# Patient Record
Sex: MALE | Race: WHITE | NOT HISPANIC OR LATINO | URBAN - NONMETROPOLITAN AREA
[De-identification: names, ages, dates, MRNs, and addresses within clinical notes are randomized per-mention and may not be internally consistent; named-entity substitution may affect disease eponyms.]

---

## 2018-09-05 PROBLEM — H33.001: Noted: 2018-09-28

## 2018-09-05 PROBLEM — H52.4: Noted: 2019-06-28

## 2018-09-05 PROBLEM — E11.9: Noted: 2018-09-28

## 2018-09-05 PROBLEM — Z96.1: Noted: 2018-09-05

## 2019-06-28 ENCOUNTER — IMPORTED ENCOUNTER (OUTPATIENT)
Dept: URBAN - NONMETROPOLITAN AREA CLINIC 1 | Facility: CLINIC | Age: 74
End: 2019-06-28

## 2019-06-28 PROCEDURE — 92014 COMPRE OPH EXAM EST PT 1/>: CPT

## 2019-06-28 PROCEDURE — 92015 DETERMINE REFRACTIVE STATE: CPT

## 2019-06-28 NOTE — PATIENT DISCUSSION
Presbyopia OUDiscussed refractive status in detail with patient New glasses Rx given todayContinue to monitorPseudophakia OU Discussed diagnosis in detail with patientBoth intraocular implants in place with open capsules OU Continue to monitor NIDDM Sans Retinopathy OUDiscussed diagnosis in detail with patientDiscussed the risk of diabetic damage of the retina with potential vision loss and the importance of routine follow-up. Stressed importance of blood sugar controlWill continue to monitorRD SCAR OD oldDiscussed diagnosis in detail with patientDiscussed in detail the S/S of retinal detachmentWimasha continue to monitor; 's Notes: MR 6/28/19DFE 6/28/19OCT 6/28/19OPTOS 6/28/19

## 2020-09-04 ENCOUNTER — IMPORTED ENCOUNTER (OUTPATIENT)
Dept: URBAN - NONMETROPOLITAN AREA CLINIC 1 | Facility: CLINIC | Age: 75
End: 2020-09-04

## 2020-09-04 PROCEDURE — 92014 COMPRE OPH EXAM EST PT 1/>: CPT

## 2020-09-04 PROCEDURE — 92015 DETERMINE REFRACTIVE STATE: CPT

## 2020-09-04 NOTE — PATIENT DISCUSSION
*EHR System was down @ time of exam all data entered in later date 9/8/20 by zane COCHRAN* Presbyopia OUDiscussed refractive status in detail with patient New glasses Rx given todayContinue to monitorPatient expressed that he is interested in any laser vision enhancment or lasik procedures. Discussed options. Recommend evaluation with Dr. Qiana Mendoza. patient agreesPseudophakia OU Discussed diagnosis in detail with patientBoth intraocular implants in place with open capsules OU Continue to monitor NIDDM Sans Retinopathy OUDiscussed diagnosis in detail with patientDiscussed the risk of diabetic damage of the retina with potential vision loss and the importance of routine follow-up. Stressed importance of blood sugar controlWill continue to monitorRD SCAR OD oldDiscussed diagnosis in detail with patientDiscussed in detail the S/S of retinal detachmentWill continue to monitor; Dr's Notes: MR 9/8/20DFE 9/8/20 OCT 6/28/19OPTOS 6/28/19

## 2021-02-01 ENCOUNTER — IMPORTED ENCOUNTER (OUTPATIENT)
Dept: URBAN - NONMETROPOLITAN AREA CLINIC 1 | Facility: CLINIC | Age: 76
End: 2021-02-01

## 2021-02-01 PROCEDURE — 92014 COMPRE OPH EXAM EST PT 1/>: CPT

## 2021-02-01 PROCEDURE — 92015 DETERMINE REFRACTIVE STATE: CPT

## 2021-02-01 NOTE — PATIENT DISCUSSION
Presbyopia OUDiscussed refractive status in detail with patient. New glasses Rx given today. Continue to monitor. Patient expressed that he is interested in any laser vision enhancment or lasik procedures. Discussed options. Recommend evaluation with Dr. Mary Scott. patient agrees. 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 Both intraocular implants in place with open capsules OU. Continue to monitor. NIDDM Sans Retinopathy OUDiscussed diagnosis in detail with patient. Discussed the risk of diabetic damage of the retina with potential vision loss and the importance of routine follow-up. Stressed importance of blood sugar control. Will continue to monitor. RD SCAR OD oldDiscussed diagnosis in detail with patientDiscussed in detail the S/S of retinal detachmentWill continue to monitor; 's Notes: MR  2/1/21DFE  2/1/21 OCT 6/28/19OPTOS 6/28/19

## 2022-04-04 ENCOUNTER — ESTABLISHED PATIENT (OUTPATIENT)
Dept: URBAN - NONMETROPOLITAN AREA CLINIC 1 | Facility: CLINIC | Age: 77
End: 2022-04-04

## 2022-04-04 DIAGNOSIS — H52.4: ICD-10-CM

## 2022-04-04 PROCEDURE — 92015 DETERMINE REFRACTIVE STATE: CPT

## 2022-04-04 PROCEDURE — 92014 COMPRE OPH EXAM EST PT 1/>: CPT

## 2022-04-04 ASSESSMENT — TONOMETRY
OD_IOP_MMHG: 17
OS_IOP_MMHG: 18

## 2022-04-04 ASSESSMENT — VISUAL ACUITY
OS_CC: 20/25-2
OD_CC: 20/29

## 2022-04-09 ASSESSMENT — TONOMETRY
OD_IOP_MMHG: 14
OD_IOP_MMHG: 16
OS_IOP_MMHG: 14
OS_IOP_MMHG: 11
OS_IOP_MMHG: 18
OD_IOP_MMHG: 10

## 2022-04-09 ASSESSMENT — VISUAL ACUITY
OU_CC: 20/40
OS_SC: 20/29-1
OD_SC: 20/29-1
OD_SC: 20/20
OS_SC: 20/40
OS_SC: 20/30
OD_SC: 20/20-

## 2022-04-25 ENCOUNTER — CONTACT LENSES/GLASSES VISIT (OUTPATIENT)
Dept: URBAN - NONMETROPOLITAN AREA CLINIC 1 | Facility: CLINIC | Age: 77
End: 2022-04-25

## 2022-04-25 DIAGNOSIS — H52.4: ICD-10-CM

## 2022-04-25 PROCEDURE — 92015 DETERMINE REFRACTIVE STATE: CPT

## 2022-04-25 ASSESSMENT — VISUAL ACUITY
OD_CC: 20/63-1
OS_CC: 20/100-2
OS_PH: 20/63
OD_PH: 20/50

## 2023-06-23 ENCOUNTER — ESTABLISHED PATIENT (OUTPATIENT)
Dept: URBAN - NONMETROPOLITAN AREA CLINIC 1 | Facility: CLINIC | Age: 78
End: 2023-06-23

## 2023-06-23 DIAGNOSIS — H52.4: ICD-10-CM

## 2023-06-23 PROCEDURE — 92015 DETERMINE REFRACTIVE STATE: CPT

## 2023-06-23 PROCEDURE — 92014 COMPRE OPH EXAM EST PT 1/>: CPT

## 2023-06-23 ASSESSMENT — VISUAL ACUITY
OD_CC: 20/40
OS_CC: 20/25-

## 2023-06-23 ASSESSMENT — TONOMETRY
OS_IOP_MMHG: 16
OD_IOP_MMHG: 16